# Patient Record
Sex: MALE | Race: WHITE | Employment: OTHER | URBAN - METROPOLITAN AREA
[De-identification: names, ages, dates, MRNs, and addresses within clinical notes are randomized per-mention and may not be internally consistent; named-entity substitution may affect disease eponyms.]

---

## 2020-12-09 ENCOUNTER — OFFICE VISIT (OUTPATIENT)
Dept: CARDIOLOGY CLINIC | Age: 50
End: 2020-12-09
Payer: COMMERCIAL

## 2020-12-09 VITALS — WEIGHT: 151.8 LBS | HEART RATE: 64 BPM | DIASTOLIC BLOOD PRESSURE: 80 MMHG | SYSTOLIC BLOOD PRESSURE: 130 MMHG

## 2020-12-09 PROBLEM — F17.200 SMOKING: Status: ACTIVE | Noted: 2020-12-09

## 2020-12-09 PROBLEM — R94.31 ABNORMAL ECG: Status: ACTIVE | Noted: 2020-12-09

## 2020-12-09 PROBLEM — I48.0 PAROXYSMAL ATRIAL FIBRILLATION (HCC): Status: ACTIVE | Noted: 2020-12-09

## 2020-12-09 PROCEDURE — 99203 OFFICE O/P NEW LOW 30 MIN: CPT | Performed by: INTERNAL MEDICINE

## 2020-12-09 RX ORDER — METHADONE HYDROCHLORIDE 10 MG/1
150 TABLET ORAL DAILY
COMMUNITY

## 2020-12-09 NOTE — PROGRESS NOTES
CL, CO2, BUN, CREATININE, GLUCOSE, CALCIUM, PROT, LABALBU, BILITOT, ALKPHOS, AST, ALT, LABGLOM, GFRAA, AGRATIO, GLOB      No results found for: CHOL  No results found for: TRIG  No results found for: HDL  No results found for: LDLCHOLESTEROL, LDLCALC  No results found for: LABVLDL, VLDL  No results found for: CHOLHDLRATIO    No results found for: INR, PROTIME    The ASCVD Risk score ([place] Cheryl., et al., 2013) failed to calculate for the following reasons:    Cannot find a previous HDL lab    Cannot find a previous total cholesterol lab      Assessment / Plan:      Diagnosis Orders   1. Abnormal ECG  CT CARDIAC CALCIUM SCORING   2. Smoking  CT CARDIAC CALCIUM SCORING   3. Paroxysmal atrial fibrillation (HCC)        1. PAF:   Patient has minimal symptoms at this time.     -Will monitor.   -Start asa 81 daily    2. Abnormal ECG:   Nonspecific changes on ECG, no clear evidence of ischemia. He has no exertional symptoms.     -Will order a CT calcium score  -Check FLP  -QTc within normal limits, hence patient may continue methadone. Return in about 1 year (around 12/9/2021). I have spent 40 minutes of face to face time with the patient with more than 50% spent counseling and coordinating care. I have personally reviewed the reports and images of labs, radiological studies, cardiac studies including ECG's and telemetry, current and old medical records. The note was completed using EMR and Dragon dictation system. Every effort was made to ensure accuracy; however, inadvertent computerized transcription errors may be present. All questions and concerns were addressed to the patient/family. Alternatives to my treatment were discussed. I would like to thank you for providing me the opportunity to participate in the care of your patient. If you have any questions, please do not hesitate to contact me.      Yifan Nielson MD, [facility] [place], [address]  The [address] [facility] [address] Phone: [phone]  Heart Failure Hotline: [phone]  Fax: [phone]